# Patient Record
Sex: MALE | Race: WHITE | Employment: STUDENT | URBAN - METROPOLITAN AREA
[De-identification: names, ages, dates, MRNs, and addresses within clinical notes are randomized per-mention and may not be internally consistent; named-entity substitution may affect disease eponyms.]

---

## 2017-06-27 DIAGNOSIS — Q21.11 OSTIUM SECUNDUM TYPE ATRIAL SEPTAL DEFECT: Primary | ICD-10-CM

## 2017-07-06 ENCOUNTER — RADIANT APPOINTMENT (OUTPATIENT)
Dept: CARDIOLOGY | Facility: CLINIC | Age: 22
End: 2017-07-06
Attending: PEDIATRICS
Payer: COMMERCIAL

## 2017-07-06 ENCOUNTER — OFFICE VISIT (OUTPATIENT)
Dept: CARDIOLOGY | Facility: CLINIC | Age: 22
End: 2017-07-06
Payer: COMMERCIAL

## 2017-07-06 VITALS
SYSTOLIC BLOOD PRESSURE: 106 MMHG | RESPIRATION RATE: 20 BRPM | HEIGHT: 69 IN | BODY MASS INDEX: 22.27 KG/M2 | HEART RATE: 61 BPM | WEIGHT: 150.35 LBS | OXYGEN SATURATION: 99 % | DIASTOLIC BLOOD PRESSURE: 60 MMHG

## 2017-07-06 DIAGNOSIS — Q21.11 OSTIUM SECUNDUM TYPE ATRIAL SEPTAL DEFECT: ICD-10-CM

## 2017-07-06 DIAGNOSIS — Q21.11 OSTIUM SECUNDUM TYPE ATRIAL SEPTAL DEFECT: Primary | ICD-10-CM

## 2017-07-06 PROCEDURE — 99213 OFFICE O/P EST LOW 20 MIN: CPT | Mod: 25 | Performed by: PEDIATRICS

## 2017-07-06 PROCEDURE — 93303 ECHO TRANSTHORACIC: CPT

## 2017-07-06 PROCEDURE — 93325 DOPPLER ECHO COLOR FLOW MAPG: CPT

## 2017-07-06 PROCEDURE — 93320 DOPPLER ECHO COMPLETE: CPT

## 2017-07-06 RX ORDER — VALACYCLOVIR HYDROCHLORIDE 1 G/1
TABLET, FILM COATED ORAL
COMMUNITY
Start: 2017-06-22

## 2017-07-06 NOTE — MR AVS SNAPSHOT
After Visit Summary   7/6/2017    Zachery Elena    MRN: [id number]           Patient Information     Date Of Birth          1995        Visit Information        Provider Department      7/6/2017 4:20 PM Edgar Salinas MD [facility]        Today's Diagnoses     Ostium secundum type atrial septal defect    -  1      Care Instructions    Thank you for choosing [facility]. It was a pleasure to see you for your office visit today.     To reach our Specialty Clinic: [phone]  To reach our Imaging scheduler: [phone]      If you had any blood work, imaging or other tests:  Normal test results will be mailed to your home address in a letter  Abnormal results will be communicated to you via phone call/letter  Please allow up to 1-2 weeks for processing/interpretation of most lab work  If you have questions or concerns call our clinic at [phone]            Follow-ups after your visit        Follow-up notes from your care team     Return in about 4 years (around 7/6/2021).      Who to contact     If you have questions or need follow up information about today's clinic visit or your schedule please contact [facility] directly at [phone].  Normal or non-critical lab and imaging results will be communicated to you by MyChart, letter or phone within 4 business days after the clinic has received the results. If you do not hear from us within 7 days, please contact the clinic through [employer] or phone. If you have a critical or abnormal lab result, we will notify you by phone as soon as possible.  Submit refill requests through [employer] or call your pharmacy and they will forward the refill request to us. Please allow 3 business days for your refill to be completed.          Additional Information About Your Visit        Server[employer] Information     [employer] is an electronic gateway that provides easy, online access to your medical records.  "With MyChart, you can request a clinic appointment, read your test results, renew a prescription or communicate with your care team.     To sign up for [employer] visit the website at [email or web address]   You will be asked to enter the access code listed below, as well as some personal information. Please follow the directions to create your username and password.     Your access code is: [email or web address]  Expires: 10/4/2017  4:32 PM     Your access code will  in 90 days. If you need help or a new code, please contact your [facility] or call [phone] for assistance.        Care EveryWhere ID     This is your Care EveryWhere ID. This could be used by other organizations to access your [place] medical records  [id number]        Your Vitals Were     Pulse Respirations Height Pulse Oximetry BMI (Body Mass Index)       61 20 5' 9.21\" (1.758 m) 99% 22.07 kg/m2        Blood Pressure from Last 3 Encounters:   17 106/60   14 124/69    Weight from Last 3 Encounters:   17 150 lb 5.7 oz (68.2 kg)   14 147 lb 4.3 oz (66.8 kg) (44 %)*     * Growth percentiles are based on [facility] 2-20 Years data.              Today, you had the following     No orders found for display       Primary Care Provider Office Phone # Fax #    Sanya Byrne [phone]       [facility] [address] [place] [address] DR HERNANDEZ [address] [place] [id number]        Equal Access to Services     KRYSTIAN WINKLER AH: Hadii cole sheehan hadasho Sokathy, waaxda luqadaha, qaybta kaalmada sean mora. So [facility] [phone].    ATENCIÓN: Si habla español, tiene a barahona disposición servicios gratuitos de asistencia lingüística. Llame al [phone].    We comply with applicable federal civil rights laws and [place] laws. We do not discriminate on the basis of race, color, national origin, age, disability sex, sexual orientation or gender identity.            Thank " you!     Thank you for choosing [facility]  for your care. Our goal is always to provide you with excellent care. Hearing back from our patients is one way we can continue to improve our services. Please take a few minutes to complete the written survey that you may receive in the mail after your visit with us. Thank you!             Your Updated Medication List - Protect others around you: Learn how to safely use, store and throw away your medicines at www.disposemymeds.org.          This list is accurate as of: 7/6/17  4:32 PM.  Always use your most recent med list.                   Brand Name Dispense Instructions for use Diagnosis    valACYclovir 1000 mg tablet    VALTREX

## 2017-07-06 NOTE — NURSING NOTE
"Zachery Elena's goals for this visit include:   Chief Complaint   Patient presents with     Heart Problem       He requests these members of his care team be copied on today's visit information: Yes PCP    PCP: Sanya Byrne    Referring Provider:  Sanya Byrne MD  [facility]  [address]  [place] [address]    Chief Complaint   Patient presents with     Heart Problem       Initial /60  Pulse 61  Resp 20  Ht 1.758 m (5' 9.21\")  Wt 68.2 kg (150 lb 5.7 oz)  SpO2 99%  BMI 22.07 kg/m2 Estimated body mass index is 22.07 kg/(m^2) as calculated from the following:    Height as of this encounter: 1.758 m (5' 9.21\").    Weight as of this encounter: 68.2 kg (150 lb 5.7 oz).  Medication Reconciliation: complete    Do you need any medication refills at today's visit? NO    "

## 2017-07-06 NOTE — PATIENT INSTRUCTIONS
Thank you for choosing [facility]. It was a pleasure to see you for your office visit today.     To reach our Specialty Clinic: [phone]  To reach our Imaging scheduler: [phone]      If you had any blood work, imaging or other tests:  Normal test results will be mailed to your home address in a letter  Abnormal results will be communicated to you via phone call/letter  Please allow up to 1-2 weeks for processing/interpretation of most lab work  If you have questions or concerns call our clinic at [phone]

## 2017-07-06 NOTE — PROGRESS NOTES
"                                               PEDS Cardiac Consult Letter  Date: 2017      Sanya Byrne MD  [facility]  [address]  [facility] [address]  [place] [address]      PATIENT: Zachery Elena  :          1995   PORTER:          2017    Dear Dr. Byrne:    Zachery is 21 years old and was seen at the [facility] on 2017.   He had device closure of an atrial septal defect on 10/14/99. A 17 mm Amplatzer septal occluder device was implanted. Since that time he has done exceptionally well. He is asymptomatic. He participates in the gym at least 3 times a week and plays basketball. He is not experiencing chest pain or syncope. He has become dizzy with intensive basketball. He works as a  this summer.    On physical examination his height was 5' 9.21\" (1.758 m) and his weight was 150 lb 5.7 oz (68.2 kg).  His heart rate was 61  and respirations 20 per minute.  The blood pressure in his right arm was 106/60.  He was acyanotic, warm and well perfused. He was alert cooperative and in no distress.  His lungs were clear to auscultation without respiratory distress.  He had a regular rhythm with a grade 1/6 vibratory systolic ejection murmur at the lower left sternal border.  The second heart sound was physiologically split with a normal pulmonary component.   There was no organomegaly or abdominal tenderness.  Peripheral pulses were 2+ and equal in all extremities.  There was no clubbing or edema.    An echocardiogram performed today that I personally reviewed and explained to him showed his device in good position with no residual leak. There is no mitral or aortic insufficiency. There was no pericardial effusion.    Zachery has an excellent result from device closure of his atrial septal defect. He does not need any restriction of his activities. Although it is been 17-1/2 years since his device was implanted, we do not have 60 year follow-up with " this device. I recommend that he return in 4 years with an echocardiogram. If he experiences chest pain or syncope, I should see him sooner.      Thank you very much for your confidence in allowing me to participate in Zachery's care.  If you have any questions or concerns, please don't hesitate to contact me.    Sincerely,      Edgar Salinas M.D.   Pediatric Cardiology   [facility]  Pediatric Specialty Clinic  [phone]    Note: Chart documentation done in part with Dragon Voice Recognition software. Although reviewed after completion, some word and grammatical errors may remain.      cc: Patient

## 2017-07-06 NOTE — LETTER
"2017      RE: Zachery Elena  [address] [facility] [address]                                                      PEDS Cardiac Consult Letter  Date: 2017      Sanya Byrne MD  [facility]  [address] HERNANDEZ [address] [place] [address]      PATIENT: Zachery Elena  :          1995   PORTER:          2017    Dear Dr. Byrne:    Zachery is 21 years old and was seen at the [facility] on 2017.   He had device closure of an atrial septal defect on 10/14/99. A 17 mm Amplatzer septal occluder device was implanted. Since that time he has done exceptionally well. He is asymptomatic. He participates in the gym at least 3 times a week and plays basketball. He is not experiencing chest pain or syncope. He has become dizzy with intensive basketball. He works as a  this summer.    On physical examination his height was 5' 9.21\" (1.758 m) and his weight was 150 lb 5.7 oz (68.2 kg).  His heart rate was 61  and respirations 20 per minute.  The blood pressure in his right arm was 106/60.  He was acyanotic, warm and well perfused. He was alert cooperative and in no distress.  His lungs were clear to auscultation without respiratory distress.  He had a regular rhythm with a grade 1/6 vibratory systolic ejection murmur at the lower left sternal border.  The second heart sound was physiologically split with a normal pulmonary component.   There was no organomegaly or abdominal tenderness.  Peripheral pulses were 2+ and equal in all extremities.  There was no clubbing or edema.    An echocardiogram performed today that I personally reviewed and explained to him showed his device in good position with no residual leak. There is no mitral or aortic insufficiency. There was no pericardial effusion.    Zachery has an excellent result from device closure of his atrial septal defect. He does not need any restriction of his activities. Although it is " been 17-1/2 years since his device was implanted, we do not have 60 year follow-up with this device. I recommend that he return in 4 years with an echocardiogram. If he experiences chest pain or syncope, I should see him sooner.      Thank you very much for your confidence in allowing me to participate in Zachery's care.  If you have any questions or concerns, please don't hesitate to contact me.    Sincerely,      Edgar Salinas M.D.   Pediatric Cardiology   [facility]  Pediatric Specialty Clinic  [phone]    Note: Chart documentation done in part with Dragon Voice Recognition software. Although reviewed after completion, some word and grammatical errors may remain.      cc: Patient    Edgar Salinas MD

## 2021-11-12 DIAGNOSIS — Q21.11 OSTIUM SECUNDUM TYPE ATRIAL SEPTAL DEFECT: Primary | ICD-10-CM

## 2021-12-02 ENCOUNTER — ANCILLARY PROCEDURE (OUTPATIENT)
Dept: CARDIOLOGY | Facility: CLINIC | Age: 26
End: 2021-12-02
Payer: COMMERCIAL

## 2021-12-02 ENCOUNTER — OFFICE VISIT (OUTPATIENT)
Dept: CARDIOLOGY | Facility: CLINIC | Age: 26
End: 2021-12-02
Payer: COMMERCIAL

## 2021-12-02 VITALS
HEIGHT: 70 IN | OXYGEN SATURATION: 98 % | SYSTOLIC BLOOD PRESSURE: 113 MMHG | BODY MASS INDEX: 20.55 KG/M2 | RESPIRATION RATE: 12 BRPM | WEIGHT: 143.52 LBS | HEART RATE: 65 BPM | DIASTOLIC BLOOD PRESSURE: 65 MMHG

## 2021-12-02 DIAGNOSIS — Q21.11 OSTIUM SECUNDUM TYPE ATRIAL SEPTAL DEFECT: ICD-10-CM

## 2021-12-02 DIAGNOSIS — Q21.11 OSTIUM SECUNDUM TYPE ATRIAL SEPTAL DEFECT: Primary | ICD-10-CM

## 2021-12-02 PROCEDURE — 93325 DOPPLER ECHO COLOR FLOW MAPG: CPT | Performed by: PEDIATRICS

## 2021-12-02 PROCEDURE — 93320 DOPPLER ECHO COMPLETE: CPT | Performed by: PEDIATRICS

## 2021-12-02 PROCEDURE — 93303 ECHO TRANSTHORACIC: CPT | Performed by: PEDIATRICS

## 2021-12-02 PROCEDURE — 99203 OFFICE O/P NEW LOW 30 MIN: CPT | Mod: 25 | Performed by: PEDIATRICS

## 2021-12-02 ASSESSMENT — MIFFLIN-ST. JEOR: SCORE: 1642.25

## 2021-12-02 NOTE — PATIENT INSTRUCTIONS
Thank you for choosing [facility]. It was a pleasure to see you for your office visit today.     If you have any questions or scheduling needs during regular office hours, please call our [place] clinic: [phone]   If urgent concerns arise after hours, you can call [phone] and ask to speak to the pediatric specialist on call.   If you need to schedule Radiology tests, please call: [phone]  My Chart messages are for routine communication and questions and are usually answered within 48-72 hours. If you have an urgent concern or require sooner response, please call us.  Outside lab and imaging results should be faxed to [phone].  If you go to a lab outside of [facility] we will not automatically get those results. You will need to ask to have them faxed.       If you had any blood work, imaging or other tests completed today:  Normal test results will be mailed to your home address in a letter.  Abnormal results will be communicated to you via phone call/letter.  Please allow up to 1-2 weeks for processing and interpretation of most lab work.

## 2021-12-02 NOTE — NURSING NOTE
"Zachery Elena's goals for this visit include: 4 year follow-up ASD    He requests these members of his care team be copied on today's visit information: yes    PCP: Antwon Stewart    Referring Provider:  Antwon Stewart MD  [facility]  [address] [place] [address]    /65 (BP Location: Right arm, Patient Position: Sitting, Cuff Size: Adult Regular)   Pulse 65   Resp 12   Ht 1.778 m (5' 10\")   Wt 65.1 kg (143 lb 8.3 oz)   SpO2 98%   BMI 20.59 kg/m          "

## 2021-12-02 NOTE — LETTER
"  2021      RE: Zachery Elena  [address] [facility] [address]                                                      PEDS Cardiac Consult Letter  Date: 2021      Sanya Byrne MD  [facility]   [address] [place] [address] [address],   [facility] [address]      PATIENT: Zachery Elena  :          1995   PORTER:          2021    Dear Dr. Byrne:    Zachery is 25 years old and was seen at the [facility] on 2021.   He is followed after closure of an atrial septal defect with a 17 mm ASO device on 10/14/1999.  Since that time he has remained completely asymptomatic.    On physical examination his height was 1.778 m (5' 10\") and his weight was 65.1 kg (143 lb 8.3 oz).  His heart rate was 65  and respirations 12 per minute.  The blood pressure in his right arm was 113/65.  He was acyanotic, warm and well perfused. He was alert cooperative and in no distress.  His lungs were clear to auscultation without respiratory distress.  He had a regular rhythm with no murmur.  The second heart sound was physiologically split with a normal pulmonary component.   There was no organomegaly or abdominal tenderness.  Peripheral pulses were 2+ and equal in all extremities.  There was no clubbing or edema.    An echocardiogram performed today that I personally reviewed showed his device in good position with no residual shunt.  There was no right-sided cardiac enlargement and right ventricular function was normal.  There was no pericardial effusion.  An electrocardiogram performed today that I personally reviewed demonstrated sinus bradycardia and was otherwise within normal limits.  Explained these findings to him.    Zachery has an excellent result from device closure of his atrial septal defect.  I believe that his slow heart rate is not a concern as his exercise tolerance is normal and he has not experienced any dizziness or syncope.  He has not had any palpitations. "  Although it has been 22 years since his ASD was closed, we do not have long-term follow-up on patients whose ASD's were closed with this device.  I recommend that he be seen again in 5 years as long as he remains asymptomatic.  I again explained that he had a 2 or 3% chance of having a child with congenital heart disease.    Thank you very much for your confidence in allowing me to participate in Zachery's care.  If you have any questions or concerns, please don't hesitate to contact me.    Sincerely,      Edgar Salinas M.D.   Pediatric Cardiology   [facility]  Pediatric Specialty Clinic  [phone]    Note: Chart documentation done in part with Dragon Voice Recognition software. Although reviewed after completion, some word and grammatical errors may remain.       Edgar Salinas MD

## 2021-12-02 NOTE — PROGRESS NOTES
"                                               PEDS Cardiac Consult Letter  Date: 2021      Sanya Byrne MD  [facility]   [facility] [facility] [address]      PATIENT: Zachery Elena  :          1995   PORTER:          2021    Dear Dr. Byrne:    Zachery is 25 years old and was seen at the [facility] on 2021.   He is followed after closure of an atrial septal defect with a 17 mm ASO device on 10/14/1999.  Since that time he has remained completely asymptomatic.    On physical examination his height was 1.778 m (5' 10\") and his weight was 65.1 kg (143 lb 8.3 oz).  His heart rate was 65  and respirations 12 per minute.  The blood pressure in his right arm was 113/65.  He was acyanotic, warm and well perfused. He was alert cooperative and in no distress.  His lungs were clear to auscultation without respiratory distress.  He had a regular rhythm with no murmur.  The second heart sound was physiologically split with a normal pulmonary component.   There was no organomegaly or abdominal tenderness.  Peripheral pulses were 2+ and equal in all extremities.  There was no clubbing or edema.    An echocardiogram performed today that I personally reviewed showed his device in good position with no residual shunt.  There was no right-sided cardiac enlargement and right ventricular function was normal.  There was no pericardial effusion.  An electrocardiogram performed today that I personally reviewed demonstrated sinus bradycardia and was otherwise within normal limits.  Explained these findings to him.    Zachery has an excellent result from device closure of his atrial septal defect.  I believe that his slow heart rate is not a concern as his exercise tolerance is normal and he has not experienced any dizziness or syncope.  He has not had any palpitations.  Although it has been 22 years since his ASD was closed, we do not have long-term " follow-up on patients whose ASD's were closed with this device.  I recommend that he be seen again in 5 years as long as he remains asymptomatic.  I again explained that he had a 2 or 3% chance of having a child with congenital heart disease.    Thank you very much for your confidence in allowing me to participate in Zachery's care.  If you have any questions or concerns, please don't hesitate to contact me.    Sincerely,      Edgar Salinas M.D.   Pediatric Cardiology   [facility]  Pediatric Specialty Clinic  [phone]    Note: Chart documentation done in part with Dragon Voice Recognition software. Although reviewed after completion, some word and grammatical errors may remain.

## 2022-01-11 ENCOUNTER — TELEPHONE (OUTPATIENT)
Dept: PEDIATRIC CARDIOLOGY | Facility: CLINIC | Age: 27
End: 2022-01-11
Payer: COMMERCIAL

## 2022-01-11 NOTE — TELEPHONE ENCOUNTER
[facility] Call Center    Phone Message    May a detailed message be left on voicemail: no     Reason for Call: Other: Mom states that an echo was sent to pt's pediatrician instead of his internal med doctor.  Asking for the 12.2.21 report to be sent to current doctor.      Internal Medicine: Antwon Stewart MD  [facility]  FAX: [phone]  PHONE: [phone]      Action Taken: Message routed to:  Pediatric Clinics: Cardiology p [id number]    Travel Screening: Not Applicable